# Patient Record
Sex: FEMALE | Race: OTHER | Employment: OTHER | URBAN - METROPOLITAN AREA
[De-identification: names, ages, dates, MRNs, and addresses within clinical notes are randomized per-mention and may not be internally consistent; named-entity substitution may affect disease eponyms.]

---

## 2019-11-08 LAB — MAMMOGRAPHY, EXTERNAL: NORMAL

## 2020-01-14 LAB — MAMMOGRAPHY, EXTERNAL: NORMAL

## 2021-05-26 LAB — CREATININE, EXTERNAL: 0.6

## 2021-07-13 ENCOUNTER — OFFICE VISIT (OUTPATIENT)
Dept: INTERNAL MEDICINE CLINIC | Age: 66
End: 2021-07-13
Payer: MEDICARE

## 2021-07-13 VITALS
HEIGHT: 62 IN | HEART RATE: 72 BPM | RESPIRATION RATE: 15 BRPM | SYSTOLIC BLOOD PRESSURE: 130 MMHG | TEMPERATURE: 98.3 F | WEIGHT: 113 LBS | BODY MASS INDEX: 20.8 KG/M2 | DIASTOLIC BLOOD PRESSURE: 82 MMHG

## 2021-07-13 DIAGNOSIS — E55.9 VITAMIN D DEFICIENCY: ICD-10-CM

## 2021-07-13 DIAGNOSIS — R63.4 WEIGHT LOSS: ICD-10-CM

## 2021-07-13 DIAGNOSIS — G40.909 SEIZURE DISORDER (HCC): ICD-10-CM

## 2021-07-13 DIAGNOSIS — Z13.220 SCREENING CHOLESTEROL LEVEL: ICD-10-CM

## 2021-07-13 DIAGNOSIS — Z79.811 USE OF ANASTROZOLE: ICD-10-CM

## 2021-07-13 DIAGNOSIS — E53.8 VITAMIN B12 DEFICIENCY (NON ANEMIC): ICD-10-CM

## 2021-07-13 DIAGNOSIS — Z85.3 HISTORY OF BREAST CANCER: ICD-10-CM

## 2021-07-13 DIAGNOSIS — Z86.79 HISTORY OF INTRACRANIAL ANEURYSM: ICD-10-CM

## 2021-07-13 DIAGNOSIS — G62.9 NEUROPATHY: ICD-10-CM

## 2021-07-13 DIAGNOSIS — M85.852 OSTEOPENIA OF LEFT HIP: ICD-10-CM

## 2021-07-13 PROBLEM — I67.1 BRAIN ANEURYSM: Status: ACTIVE | Noted: 2021-07-13

## 2021-07-13 PROCEDURE — G8510 SCR DEP NEG, NO PLAN REQD: HCPCS | Performed by: INTERNAL MEDICINE

## 2021-07-13 PROCEDURE — 99204 OFFICE O/P NEW MOD 45 MIN: CPT | Performed by: INTERNAL MEDICINE

## 2021-07-13 PROCEDURE — G8400 PT W/DXA NO RESULTS DOC: HCPCS | Performed by: INTERNAL MEDICINE

## 2021-07-13 PROCEDURE — 1101F PT FALLS ASSESS-DOCD LE1/YR: CPT | Performed by: INTERNAL MEDICINE

## 2021-07-13 PROCEDURE — 1090F PRES/ABSN URINE INCON ASSESS: CPT | Performed by: INTERNAL MEDICINE

## 2021-07-13 PROCEDURE — G8427 DOCREV CUR MEDS BY ELIG CLIN: HCPCS | Performed by: INTERNAL MEDICINE

## 2021-07-13 PROCEDURE — G8536 NO DOC ELDER MAL SCRN: HCPCS | Performed by: INTERNAL MEDICINE

## 2021-07-13 PROCEDURE — G8420 CALC BMI NORM PARAMETERS: HCPCS | Performed by: INTERNAL MEDICINE

## 2021-07-13 PROCEDURE — 3017F COLORECTAL CA SCREEN DOC REV: CPT | Performed by: INTERNAL MEDICINE

## 2021-07-13 RX ORDER — BISMUTH SUBSALICYLATE 262 MG
1 TABLET,CHEWABLE ORAL DAILY
COMMUNITY

## 2021-07-13 RX ORDER — LYSINE HCL 500 MG
TABLET ORAL
COMMUNITY

## 2021-07-13 RX ORDER — ANASTROZOLE 1 MG/1
1 TABLET ORAL DAILY
COMMUNITY
Start: 2021-05-26 | End: 2022-05-21

## 2021-07-13 NOTE — PROGRESS NOTES
Flip Lee is a 72 y.o. female and presents with Establish Care (Patient presents to est carw with new PCP. Patient would like to address unwanted weight loss. )    Ms. Rodney Torres came to establish with me, she follows oncologist at [facility] she has history of breast cancer, according to her it was removed with laser, and then now she is taking anastrozole mammo June 2021 I reviewed in the epic that she followed, Dr. 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 Darius Campzuano and, she is getting levetiracetam refills are filled with, oncologist and antiseizure medicine with Dr. Darius Campuzano appropriately. She told me that she has consistent weight loss, without any intention, she eats well,3 times a day, she has some dental treatment going on, she does not like to snack in between because she has to do gargles and floss she has to take care of her mother was 80year-old, now she has help, with aid 40 hours a week but every day almost 5 days a week she has to get up at night,, for her mother, and it does affect her sleep she does not have any other help except help from her  who is, anesthesiologist. No nausea, no vomiting also having some complaints and concerns for hair loss , she has taken [employer]  Biotene and still her nail but nothing helps also taking vitamin D calcium and [employer] brand multivitamins. No depression. No anxiety. , It seems like she is not getting physical rest, because of almost awakening at night, for her  Mother, who does not want anybody else except herself her mother does not want anybody else, recommended to have some help at night by the helper she told me now she is going to have a 1 month vacation, that she will go to her daughter's home in [place] her daughter and son, are in [place],. She walks a lot during exercise eating more vegetables fruits and fibers having  descent healthy lifestyle      Review of Systems    Review of Systems   Constitutional: Positive for weight loss. HENT: Negative for sore throat. Eyes: Negative for blurred vision. Respiratory: Negative for cough, shortness of breath and wheezing. Cardiovascular: Negative. Gastrointestinal: Negative. Genitourinary: Negative. Musculoskeletal: Negative. Neurological: Negative for dizziness, tremors, sensory change, speech change, focal weakness and headaches. H/o sx for aneurysm in brain at [address] [place] [address] in past and h/o seizure and now no seizure follows dr Cory Simpson   Psychiatric/Behavioral: Negative for depression, memory loss, substance abuse and suicidal ideas. The patient is not nervous/anxious and does not have insomnia.          Past Medical History:   Diagnosis Date    Aneurysm ([facility] 2005    Cancer [facility]) 2018    Breast     Seizures ([facility]     Started after brain surgery due to brain aneurysm     Past Surgical History:   Procedure Laterality Date    HX BREAST LUMPECTOMY Left 2018    NEUROLOGICAL PROCEDURE UNLISTED  2005    brain surgery     [place] BREAST SURGERY PROCEDURE UNLISTED      left breast lumpetomy      Social History     Socioeconomic History    Marital status:      Spouse name: Not on file    Number of children: Not on file    Years of education: Not on file    Highest education level: Not on file   Tobacco Use    Smoking status: Never Smoker    Smokeless tobacco: Never Used   Vaping Use    Vaping Use: Never used   Substance and Sexual Activity    Alcohol use: Never    Drug use: Never     Social Determinants of Health     Financial Resource Strain:     Difficulty of Paying Living Expenses:    Food Insecurity:     Worried About Running Out of Food in the Last Year:     Bruno of Food in the Last Year:    Transportation Needs:     Lack of Transportation (Medical):  Lack of Transportation (Non-Medical):    Physical Activity:     Days of Exercise per Week:     Minutes of Exercise per Session:    Stress:     Feeling of Stress :    Social Connections:     Frequency of Communication with Friends and Family:     Frequency of Social Gatherings with Friends and Family:     Attends Denominational Services:     Active Member of Clubs or Organizations:     Attends Club or Organization Meetings:     Marital Status:      Family History   Problem Relation Age of Onset    Arthritis-osteo Mother     No Known Problems Father     No Known Problems Sister     No Known Problems Sister     No Known Problems Sister     Cancer Sister         Breast cancer  at 48     Current Outpatient Medications   Medication Sig Dispense Refill    anastrozole (ARIMIDEX) 1 mg tablet Take 1 mg by mouth daily.  Calcium Carbonate-Vit D3-Min 600 mg calcium- 400 unit tab Take  by mouth.  multivitamin (ONE A DAY) tablet Take 1 Tablet by mouth daily.  levETIRAcetam 1,000 mg tablet Take 1 Tab by mouth two (2) times a day. Brand name medically necessary 180 Tab 0    pregabalin (LYRICA) 300 mg capsule Take 1 Cap by mouth two (2) times a day. 180 Cap 3    KEPPRA 750 mg tablet Take 1 Tab by mouth two (2) times a day. 180 Tab 3     No Known Allergies    Objective:  Visit Vitals  /82 (BP 1 Location: Left upper arm, BP Patient Position: Lying, BP Cuff Size: Adult)   Pulse 72   Temp 98.3 °F (36.8 °C) (Oral)   Resp 15   Ht 5' 2\" (1.575 m)   Wt 113 lb (51.3 kg)   BMI 20.67 kg/m²       Physical Exam:   Constitutional: General Appearance: Very pleasant . Level of Distress: NAD. Psychiatric: Mental Status: normal mood and affect Orientation: to time, place, and person. ,normal eye contact. Head: Head: normocephalic and atraumatic.    Eyes: Pupils: PERRLA. Sclerae: non-icteric. Neck: Neck: supple, trachea midline, and no masses. Lymph Nodes: no cervical LAD. Thyroid: no enlargement or nodules and non-tender. Lungs: Respiratory effort: no dyspnea. Auscultation: no wheezing, rales/crackles, or rhonchi and breath sounds normal and good air movement. Cardiovascular: Apical Impulse: not displaced. Heart Auscultation: normal S1 and S2; no murmurs, rubs, or gallops; and RRR. Neck vessels: no carotid bruits. Pulses including femoral / pedal: normal throughout. Abdomen: Bowel Sounds: normal. Inspection and Palpation: no tenderness, guarding, or masses and soft and non-distended. Liver: non-tender and no hepatomegaly. Spleen: non-tender and no splenomegaly. Musculoskeletal[de-identified] Extremities: no edema,no varicosities. No Calf tenderness. Neurologic: Gait and Station: normal gait and station. Motor Strength normal right and left. Skin: Inspection and palpation: no rash, lesions, or ulcer. No results found for this or any previous visit. Assessment/Plan:      ICD-10-CM ICD-9-CM    1. Weight loss  R63.4 783.21 TSH 3RD GENERATION      T4, FREE   2. Seizure disorder (HCC)  G40.909 345.90 CBC WITH AUTOMATED DIFF      METABOLIC PANEL, COMPREHENSIVE   3. Neuropathy  G62.9 355.9    4. Osteopenia of left hip  M85.852 733.90    5. Vitamin B12 deficiency (non anemic)  E53.8 266.2 VITAMIN B12   6. Vitamin D deficiency  E55.9 268.9 VITAMIN D, 25 HYDROXY   7. History of breast cancer  Z85.3 V10.3    8. History of intracranial aneurysm  Z86.79 V12.54    9. Use of anastrozole  Z79.811 V07.52    10. Screening cholesterol level  Z13.220 V77.91 LIPID PANEL     Orders Placed This Encounter    CBC WITH AUTOMATED DIFF    METABOLIC PANEL, COMPREHENSIVE    LIPID PANEL    TSH 3RD GENERATION    VITAMIN B12    VITAMIN D, 25 HYDROXY    T4, FREE    anastrozole (ARIMIDEX) 1 mg tablet     Sig: Take 1 mg by mouth daily.     Calcium Carbonate-Vit D3-Min 600 mg calcium- 400 unit tab     Sig: Take  by mouth.  multivitamin (ONE A DAY) tablet     Sig: Take 1 Tablet by mouth daily. Ms. Kristen Valentino came to establish with me with concern for weight loss, history of breast cancer survivor under surveillance at [facility], history of seizure disorder and neuropathy,, history of surgery for brain aneurysm at [facility], history of? Lumpectomy in the left breast and now on anastrozole and long-term use of levetiracetam and Keppra, along with Lyrica. Weight loss various, etiologies discussed she follows oncologist at [address] [place] [address] she had last visit at [facility] on 26th May she is a known case of, malignant neoplasm of upper-outer quadrant of left breast with estrogen receptor positive she had undergone bone density which shows osteopenia of left hip,  she is doing a lot of exercise including weightbearing exercise eating healthy diet taking vitamin D calcium I explained that we may give preventive medicine like bisphosphonate to prevent osteoporosis. She is going to think over., Her blood pressure is well controlled it looks like multifactorial, she is a known case also, taking levetiracetam having history of seizure after surgery for aneurysm in one of the arteries in the brain she does not know exactly but probably in cerebral artery will try to get the notes from Dr. Mona Kamara, that she gets levetiracetam, refills. She also takes [place] is due for mammogram in December 2021. She follows oncologist once a year. She takes anastrozole 1 mg once a day tolerating very well without  many side effects but she does have hot flashes. She takes Keppra 750 mg twice a day and levetiracetam 1000 mg twice a day she has some neuropathy, she takes Lyrica 300 mg twice a day., She also has to take care of her mother who is 80year-old almost bedbound and wheelchair-bound not able to walk. She is to get almost 5 days out of 7 days at night to help her.  She has some help from the aide 40 hours a week in the daytime, but almost her sleep is affected her 's anesthesiologist,, who also help her, her mother depends on her, for last several years she does not like to take help from her another sibling who does not live here,In the town. Ms. Toure Earing children are living in [place] she needs respite, help however she has decided to go for 1 month vacation, and she does not have any depression or anxiety we will try to check her thyroid function she takes skin hair and nail vitamins and [employer] brand vitamin D calcium along with multivitamins, having osteopenia she is doing weightbearing exercise eating healthy diet 3 times a day but she has dental treatment going on so she prefers not to eat in between meal because  she has to lose some floss and gargles, she does a lot of exercise  and also having  healthy lifestyle walks a lot, her weight loss may have multifactorial that she is not getting enough physical rest in nighttime, also we will try to rule out thyroid etiology and being vegetarian vitamin B12 deficiency, and vitamin D deficiency, she wants to see follow-up as needed, I reviewed the notes from heme-onc, we will try to get the notes from Dr. David Gordon, she is up to date with her, age-appropriate preventive screening including colonoscopy that she has done with Dr. Tam Sepulveda and she told me it was normal last year. Labs ordered. Answered all her questions. If she has no other new concerns  I will see her in 1 year,recommended to sign in my chart she should call the office if she needs earlier appointment,. Voiced understanding. she told me that she has neuropathy that she is taking gabapentin. continue present plan, routine labs ordered, call if any problems    There are no Patient Instructions on file for this visit. Follow-up and Dispositions    · Return if symptoms worsen or fail to improve, for  she wants to come as needed and fasting labs ordered.

## 2021-07-13 NOTE — PROGRESS NOTES
Chief Complaint   Patient presents with   [address]     Patient presents to [facility] with new PCP. Patient would like to address unwanted weight loss. Visit Vitals  /86 (BP 1 Location: Left upper arm, BP Patient Position: Sitting)   Pulse 63   Temp 98.3 °F (36.8 °C) (Oral)   Resp 15   Ht 5' 2\" (1.575 m)   Wt 113 lb (51.3 kg)   BMI 20.67 kg/m²     1. Have you been to the ER, urgent care clinic since your last visit? Hospitalized since your last visit? No    2. Have you seen or consulted any other health care providers outside of the [address] [place] [address] since your last visit? Include any pap smears or colon screening.  Dr. Armando Tipton neurologist  at [facility]

## 2021-07-14 PROBLEM — Z79.811 USE OF ANASTROZOLE: Status: ACTIVE | Noted: 2021-07-14

## 2021-07-28 DIAGNOSIS — E55.9 VITAMIN D DEFICIENCY: ICD-10-CM

## 2021-07-28 DIAGNOSIS — G62.9 NEUROPATHY: ICD-10-CM

## 2021-07-28 DIAGNOSIS — R53.83 FATIGUE, UNSPECIFIED TYPE: ICD-10-CM

## 2021-07-28 DIAGNOSIS — E53.8 VITAMIN B12 DEFICIENCY (NON ANEMIC): ICD-10-CM

## 2021-07-28 DIAGNOSIS — R63.4 WEIGHT LOSS: ICD-10-CM

## 2021-07-29 LAB
25(OH)D3+25(OH)D2 SERPL-MCNC: 34.1 NG/ML (ref 30–100)
VIT B12 SERPL-MCNC: 705 PG/ML (ref 232–1245)

## 2021-07-29 NOTE — PROGRESS NOTES
Please call Ms. Gaston Meth that her vitamin D level is within normal range it is 34.1 and vitamin B12 level is 705 which is normal suggest continue over-the-counter vitamin D minimum 1000 unit/day and healthy diet that she is taking. Including fruits and vegetables.

## 2021-08-12 PROBLEM — Z13.220 SCREENING CHOLESTEROL LEVEL: Status: RESOLVED | Noted: 2021-07-13 | Resolved: 2021-08-12

## 2021-10-15 RX ORDER — TIZANIDINE 2 MG/1
2 TABLET ORAL 3 TIMES DAILY
Qty: 30 TABLET | Refills: 0 | Status: SHIPPED | OUTPATIENT
Start: 2021-10-15 | End: 2021-10-25

## 2022-01-06 ENCOUNTER — TELEPHONE (OUTPATIENT)
Dept: INTERNAL MEDICINE CLINIC | Age: 67
End: 2022-01-06

## 2022-01-06 DIAGNOSIS — R06.2 WHEEZING: ICD-10-CM

## 2022-01-06 DIAGNOSIS — R05.9 COUGH: ICD-10-CM

## 2022-01-06 DIAGNOSIS — Z85.3 HISTORY OF BREAST CANCER: ICD-10-CM

## 2022-01-06 RX ORDER — ALBUTEROL SULFATE 90 UG/1
1 AEROSOL, METERED RESPIRATORY (INHALATION)
Qty: 18 G | Refills: 1 | Status: SHIPPED | OUTPATIENT
Start: 2022-01-06 | End: 2022-07-20

## 2022-01-06 NOTE — TELEPHONE ENCOUNTER
Pt called stating that she is wheezing when she breathes, its been going on about 2-3 weeks. Please advise.

## 2022-01-07 ENCOUNTER — OFFICE VISIT (OUTPATIENT)
Dept: INTERNAL MEDICINE CLINIC | Age: 67
End: 2022-01-07
Payer: MEDICARE

## 2022-01-07 ENCOUNTER — HOSPITAL ENCOUNTER (OUTPATIENT)
Dept: GENERAL RADIOLOGY | Age: 67
Discharge: HOME | End: 2022-01-07
Payer: MEDICARE

## 2022-01-07 VITALS
BODY MASS INDEX: 20.8 KG/M2 | OXYGEN SATURATION: 99 % | DIASTOLIC BLOOD PRESSURE: 80 MMHG | RESPIRATION RATE: 12 BRPM | HEART RATE: 67 BPM | WEIGHT: 113 LBS | SYSTOLIC BLOOD PRESSURE: 138 MMHG | HEIGHT: 62 IN

## 2022-01-07 DIAGNOSIS — R05.9 COUGH: ICD-10-CM

## 2022-01-07 DIAGNOSIS — Z85.3 HISTORY OF BREAST CANCER: ICD-10-CM

## 2022-01-07 DIAGNOSIS — R06.2 WHEEZING: ICD-10-CM

## 2022-01-07 DIAGNOSIS — G40.909 SEIZURE DISORDER (HCC): ICD-10-CM

## 2022-01-07 PROCEDURE — 3017F COLORECTAL CA SCREEN DOC REV: CPT | Performed by: INTERNAL MEDICINE

## 2022-01-07 PROCEDURE — G8510 SCR DEP NEG, NO PLAN REQD: HCPCS | Performed by: INTERNAL MEDICINE

## 2022-01-07 PROCEDURE — G8427 DOCREV CUR MEDS BY ELIG CLIN: HCPCS | Performed by: INTERNAL MEDICINE

## 2022-01-07 PROCEDURE — 1090F PRES/ABSN URINE INCON ASSESS: CPT | Performed by: INTERNAL MEDICINE

## 2022-01-07 PROCEDURE — G8420 CALC BMI NORM PARAMETERS: HCPCS | Performed by: INTERNAL MEDICINE

## 2022-01-07 PROCEDURE — 71046 X-RAY EXAM CHEST 2 VIEWS: CPT

## 2022-01-07 PROCEDURE — G8536 NO DOC ELDER MAL SCRN: HCPCS | Performed by: INTERNAL MEDICINE

## 2022-01-07 PROCEDURE — 99214 OFFICE O/P EST MOD 30 MIN: CPT | Performed by: INTERNAL MEDICINE

## 2022-01-07 PROCEDURE — G8400 PT W/DXA NO RESULTS DOC: HCPCS | Performed by: INTERNAL MEDICINE

## 2022-01-07 PROCEDURE — 1101F PT FALLS ASSESS-DOCD LE1/YR: CPT | Performed by: INTERNAL MEDICINE

## 2022-01-07 NOTE — PROGRESS NOTES
.  Please call patient that chest x-ray results are normal she has  mid thoracic scoliosis but no pneumonia,

## 2022-01-07 NOTE — PROGRESS NOTES
Rory Kelley is a 77 y.o. female and presents with Wheezing    Ms. Brando Yates called our office yesterday and she has been called to see me today. She is complaining of some wheezing for last more than 1 month she called her oncologist, she is a breast cancer survivor and she had radiation therapy to the left breast and oncologist recommended to see her primary care physician, and she has to take care of her mother who is elderly having a lot of, geriatric problems and orthopedic problems and however she is able to cope up she does not get that much short of breath but her  is anesthesiologist who checked her lung and thought that she has something on her left lung and might have pneumonia. I asked about any cough she told me no cough no fever her weight is steady. No nausea or vomiting. No PND or orthopnea. She has to get up frequently to help her mother who lives with her who is  around the age of 80 or more than that. She has taken 2 doses of COVID-vaccine and also has taken booster dose and has been up-to-date for all vaccinations. She has history of seizure disorder follows neurologist Dr. Yuni Jaimes. Review of Systems    Review of Systems   Constitutional: Negative. HENT: Negative for sore throat. Respiratory: Positive for wheezing. Negative for hemoptysis, sputum production and shortness of breath. Feels wheezing since one month at least.   Cardiovascular: Negative. Gastrointestinal: Negative. Genitourinary: Negative. Musculoskeletal: Negative. Neurological: Negative for dizziness, tingling, tremors, sensory change and headaches. Psychiatric/Behavioral: Negative for depression, hallucinations, memory loss and substance abuse. The patient is not nervous/anxious and does not have insomnia.          Past Medical History:   Diagnosis Date    Aneurysm ([facility] [address] 2005    Cancer [facility]) 2018    Breast     Seizures ([facility] [address]     Started after brain surgery due to brain aneurysm     Past Surgical History:   Procedure Laterality Date    HX BREAST LUMPECTOMY Left 2018    NEUROLOGICAL PROCEDURE UNLISTED  2005    brain surgery     [place] BREAST SURGERY PROCEDURE UNLISTED      left breast lumpetomy      Social History     Socioeconomic History    Marital status:    Tobacco Use    Smoking status: Never Smoker    Smokeless tobacco: Never Used   Vaping Use    Vaping Use: Never used   Substance and Sexual Activity    Alcohol use: Never    Drug use: Never     Family History   Problem Relation Age of Onset    OSTEOARTHRITIS Mother     No Known Problems Father     No Known Problems Sister     No Known Problems Sister     No Known Problems Sister     Cancer Sister         Breast cancer  at 48     Current Outpatient Medications   Medication Sig Dispense Refill    albuterol (PROVENTIL HFA, VENTOLIN HFA, PROAIR HFA) 90 mcg/actuation inhaler Take 1 Puff by inhalation every six (6) hours as needed for Wheezing. 18 g 1    anastrozole (ARIMIDEX) 1 mg tablet Take 1 mg by mouth daily.  Calcium Carbonate-Vit D3-Min 600 mg calcium- 400 unit tab Take  by mouth.  multivitamin (ONE A DAY) tablet Take 1 Tablet by mouth daily.  levETIRAcetam 1,000 mg tablet Take 1 Tab by mouth two (2) times a day. Brand name medically necessary 180 Tab 0    pregabalin (LYRICA) 300 mg capsule Take 1 Cap by mouth two (2) times a day. 180 Cap 3    KEPPRA 750 mg tablet Take 1 Tab by mouth two (2) times a day. 180 Tab 3     No Known Allergies    Objective:  Visit Vitals  /80 (BP 1 Location: Right upper arm, BP Patient Position: Sitting, BP Cuff Size: Adult)   Pulse 67   Resp 12   Ht 5' 2\" (1.575 m)   Wt 113 lb (51.3 kg)   SpO2 99%   BMI 20.67 kg/m²       Physical Exam:   Constitutional: General Appearance: . Pleasant level of Distress: NAD. Psychiatric: Mental Status: normal mood and affect Orientation: to time, place, and person. ,normal eye contact. Head: Head: normocephalic and atraumatic. Eyes: Pupils: PERRLA. Sclerae: non-icteric. Neck: Neck: supple, trachea midline, and no masses. Lymph Nodes: no cervical LAD. Thyroid: no enlargement or nodules and non-tender. Lungs: Respiratory effort: no dyspnea. Auscultation: no wheezing, rales/crackles, or rhonchi and breath sounds normal and good air movement. Cardiovascular: Apical Impulse: not displaced. Heart Auscultation: normal S1 and S2; no murmurs, rubs, or gallops; and RRR. Neck vessels: no carotid bruits. Pulses including femoral / pedal: normal throughout. Abdomen: Bowel Sounds: normal. Inspection and Palpation: no tenderness, guarding, or masses and soft and non-distended. Liver: non-tender and no hepatomegaly. Spleen: non-tender and no splenomegaly. Musculoskeletal[de-identified] Extremities: no edema,no varicosities. No Calf tenderness. Neurologic: Gait and Station: normal gait and station. Motor Strength normal right and left. Skin: Inspection and palpation: no rash, lesions, or ulcer. Her lung examination percussion is normal I could not, listen any rattles or wheezing or rales or crepitation bilaterally air entry is equal.  Results for orders placed or performed in visit on 09/23/21   AMB EXT CREATININE   Result Value Ref Range    Creatinine, External 0.6        Assessment/Plan:      ICD-10-CM ICD-9-CM    1. Wheezing  R06.2 786.07    2. History of breast cancer  Z85.3 V10.3    3. [facility] ([facility]  G40.909 345.90      No orders of the defined types were placed in this encounter.        feeling wheezing for last more than 1 month she told me she does not have PND orthopnea or SOB, she has no cough but, she is thinking she might have pneumonia having history of radiation to the left breast for the left breast cancer and also has history of seizure disorder but no recent seizures she follows neurologist and oncologist.,    Differential diagnosis explained and I told her and I gave her albuterol inhaler yesterday but she did not start because her  wants to do first x-ray to rule out pneumonia. I explained that sometimes allergies can cause wheezing and I wanted to start her on montelukast or antihistaminic but she told me that she does not have any allergies and I ordered x-ray and CBC CMP and x-ray is unremarkable except midthoracic scoliosis. I will call her with the results and still I will recommend my recommendation if she wants to pursue she can pursue otherwise she can monitor. I think that she and her  who is in his third lodges are more concerned to make sure she has no pneumonia and most likely no metastasis having history of breast cancer and I understand that they are not telling me but that may be the reason to have checkup. Education and counseling given. Continue nutritive diet. She does not get that much arranged taking care of her mother who is in geriatric age having multiple musculoskeletal problems that she has to get up at night. She is having her aide in the daytime to help her but she is not getting aid at the nighttime. Medicine reconciliation is done by me she is getting antiseizure medications and she is taking multivitamin and vitamin D and vitamin B12. Follow-up in 6 months. continue present plan, routine labs ordered, call if any problems    There are no Patient Instructions on file for this visit. Follow-up and Dispositions    · Return in about 6 months (around 7/7/2022) for follow up for chronic condition.

## 2022-01-07 NOTE — PROGRESS NOTES
Chief Complaint   Patient presents with    Wheezing     Visit Vitals  /80 (BP 1 Location: Right upper arm, BP Patient Position: Sitting, BP Cuff Size: Adult)   Pulse 67   Resp 12   Ht 5' 2\" (1.575 m)   Wt 113 lb (51.3 kg)   SpO2 99%   BMI 20.67 kg/m²       1. Have you been to the ER, urgent care clinic since your last visit? Hospitalized since your last visit? No    2. Have you seen or consulted any other health care providers outside of the [address] [place] [address] since your last visit? Include any pap smears or colon screening.  No

## 2022-01-08 LAB
ALBUMIN SERPL-MCNC: 4.2 G/DL (ref 3.8–4.8)
ALBUMIN/GLOB SERPL: 1.8 {RATIO} (ref 1.2–2.2)
ALP SERPL-CCNC: 108 IU/L (ref 44–121)
ALT SERPL-CCNC: 15 IU/L (ref 0–32)
AST SERPL-CCNC: 18 IU/L (ref 0–40)
BASOPHILS # BLD AUTO: 0.1 X10E3/UL (ref 0–0.2)
BASOPHILS NFR BLD AUTO: 1 %
BILIRUB SERPL-MCNC: 0.3 MG/DL (ref 0–1.2)
BUN SERPL-MCNC: 11 MG/DL (ref 8–27)
BUN/CREAT SERPL: 17 (ref 12–28)
CALCIUM SERPL-MCNC: 9.2 MG/DL (ref 8.7–10.3)
CHLORIDE SERPL-SCNC: 102 MMOL/L (ref 96–106)
CO2 SERPL-SCNC: 25 MMOL/L (ref 20–29)
CREAT SERPL-MCNC: 0.65 MG/DL (ref 0.57–1)
EOSINOPHIL # BLD AUTO: 0.5 X10E3/UL (ref 0–0.4)
EOSINOPHIL NFR BLD AUTO: 9 %
ERYTHROCYTE [DISTWIDTH] IN BLOOD BY AUTOMATED COUNT: 11.7 % (ref 11.7–15.4)
GLOBULIN SER CALC-MCNC: 2.4 G/DL (ref 1.5–4.5)
GLUCOSE SERPL-MCNC: 79 MG/DL (ref 65–99)
HCT VFR BLD AUTO: 42.8 % (ref 34–46.6)
HGB BLD-MCNC: 14 G/DL (ref 11.1–15.9)
IMM GRANULOCYTES # BLD AUTO: 0 X10E3/UL (ref 0–0.1)
IMM GRANULOCYTES NFR BLD AUTO: 0 %
LYMPHOCYTES # BLD AUTO: 1.5 X10E3/UL (ref 0.7–3.1)
LYMPHOCYTES NFR BLD AUTO: 30 %
MCH RBC QN AUTO: 28.7 PG (ref 26.6–33)
MCHC RBC AUTO-ENTMCNC: 32.7 G/DL (ref 31.5–35.7)
MCV RBC AUTO: 88 FL (ref 79–97)
MONOCYTES # BLD AUTO: 0.4 X10E3/UL (ref 0.1–0.9)
MONOCYTES NFR BLD AUTO: 8 %
NEUTROPHILS # BLD AUTO: 2.7 X10E3/UL (ref 1.4–7)
NEUTROPHILS NFR BLD AUTO: 52 %
PLATELET # BLD AUTO: 231 X10E3/UL (ref 150–450)
POTASSIUM SERPL-SCNC: 4.1 MMOL/L (ref 3.5–5.2)
PROT SERPL-MCNC: 6.6 G/DL (ref 6–8.5)
RBC # BLD AUTO: 4.87 X10E6/UL (ref 3.77–5.28)
SODIUM SERPL-SCNC: 140 MMOL/L (ref 134–144)
WBC # BLD AUTO: 5.1 X10E3/UL (ref 3.4–10.8)

## 2022-01-09 NOTE — PROGRESS NOTES
Please call Ms. Phuong Jennifer that her chest x-ray and blood work is normal so she can try albuterol inhaler that I had prescribed.

## 2022-02-02 LAB — MAMMOGRAPHY, EXTERNAL: NORMAL

## 2022-03-18 PROBLEM — E55.9 VITAMIN D DEFICIENCY: Status: ACTIVE | Noted: 2021-07-13

## 2022-03-18 PROBLEM — Z85.3 HISTORY OF BREAST CANCER: Status: ACTIVE | Noted: 2021-07-13

## 2022-03-19 PROBLEM — G62.9 NEUROPATHY: Status: ACTIVE | Noted: 2021-07-13

## 2022-03-19 PROBLEM — M85.852 OSTEOPENIA OF LEFT HIP: Status: ACTIVE | Noted: 2021-07-13

## 2022-03-19 PROBLEM — Z79.811 USE OF ANASTROZOLE: Status: ACTIVE | Noted: 2021-07-14

## 2022-03-19 PROBLEM — R05.9 COUGH: Status: ACTIVE | Noted: 2022-01-06

## 2022-03-19 PROBLEM — I67.1 BRAIN ANEURYSM: Status: ACTIVE | Noted: 2021-07-13

## 2022-03-19 PROBLEM — Z86.79 HISTORY OF INTRACRANIAL ANEURYSM: Status: ACTIVE | Noted: 2021-07-13

## 2022-03-19 PROBLEM — G40.909 SEIZURE DISORDER (HCC): Status: ACTIVE | Noted: 2021-07-13

## 2022-03-19 PROBLEM — R63.4 WEIGHT LOSS: Status: ACTIVE | Noted: 2021-07-13

## 2022-03-19 PROBLEM — R06.2 WHEEZING: Status: ACTIVE | Noted: 2022-01-06

## 2022-03-20 PROBLEM — E53.8 VITAMIN B12 DEFICIENCY (NON ANEMIC): Status: ACTIVE | Noted: 2021-07-13

## 2022-07-02 PROBLEM — R63.4 WEIGHT LOSS: Status: RESOLVED | Noted: 2021-07-13 | Resolved: 2022-07-02

## 2022-07-02 PROBLEM — Z11.59 NEED FOR HEPATITIS C SCREENING TEST: Status: ACTIVE | Noted: 2022-07-02

## 2022-07-02 PROBLEM — Z12.31 SCREENING MAMMOGRAM FOR BREAST CANCER: Status: ACTIVE | Noted: 2021-07-13

## 2022-07-02 PROBLEM — R06.2 WHEEZING: Status: RESOLVED | Noted: 2022-01-06 | Resolved: 2022-07-02

## 2022-07-02 PROBLEM — N95.9 MENOPAUSAL AND POSTMENOPAUSAL DISORDER: Status: ACTIVE | Noted: 2022-07-02

## 2022-07-02 PROBLEM — E53.8 VITAMIN B12 DEFICIENCY (NON ANEMIC): Status: RESOLVED | Noted: 2021-07-13 | Resolved: 2022-07-02

## 2022-07-02 PROBLEM — E55.9 VITAMIN D DEFICIENCY: Status: RESOLVED | Noted: 2021-07-13 | Resolved: 2022-07-02

## 2022-07-02 PROBLEM — Z12.11 SCREENING FOR COLON CANCER: Status: ACTIVE | Noted: 2021-07-13

## 2022-07-19 ENCOUNTER — TELEPHONE (OUTPATIENT)
Dept: INTERNAL MEDICINE CLINIC | Age: 67
End: 2022-07-19

## 2022-07-19 NOTE — TELEPHONE ENCOUNTER
----- Message from Tone Hampton sent at 7/19/2022  9:47 AM EDT -----  Subject: Appointment Request    Reason for Call: Established Patient Appointment needed: Routine Existing   Condition Follow Up    QUESTIONS    Reason for appointment request? No appointments available during search     Additional Information for Provider? Pt calling to schedule with Dr. Lan Cuevas for 6 mth check up. No appt were available and pt only wants to   schedule with Dr. Lan Cuevas.  Pt missed last appt.  ---------------------------------------------------------------------------  --------------  Hiral MEANS  [phone]; OK to leave message on voicemail  ---------------------------------------------------------------------------  --------------  SCRIPT ANSWERS  COVID Screen: Kaylah De Oliveira

## 2022-07-20 ENCOUNTER — OFFICE VISIT (OUTPATIENT)
Dept: INTERNAL MEDICINE CLINIC | Age: 67
End: 2022-07-20
Payer: MEDICARE

## 2022-07-20 VITALS
SYSTOLIC BLOOD PRESSURE: 130 MMHG | DIASTOLIC BLOOD PRESSURE: 81 MMHG | RESPIRATION RATE: 16 BRPM | HEIGHT: 62 IN | HEART RATE: 63 BPM | OXYGEN SATURATION: 98 % | TEMPERATURE: 97.7 F | WEIGHT: 113.14 LBS | BODY MASS INDEX: 20.82 KG/M2

## 2022-07-20 DIAGNOSIS — Z51.81 MEDICATION MONITORING ENCOUNTER: ICD-10-CM

## 2022-07-20 DIAGNOSIS — Z86.79 HISTORY OF INTRACRANIAL ANEURYSM: Primary | ICD-10-CM

## 2022-07-20 DIAGNOSIS — Z13.220 SCREENING FOR LIPID DISORDERS: ICD-10-CM

## 2022-07-20 DIAGNOSIS — Z12.31 SCREENING MAMMOGRAM FOR BREAST CANCER: ICD-10-CM

## 2022-07-20 DIAGNOSIS — Z11.59 NEED FOR HEPATITIS C SCREENING TEST: ICD-10-CM

## 2022-07-20 DIAGNOSIS — M85.852 OSTEOPENIA OF LEFT HIP: ICD-10-CM

## 2022-07-20 DIAGNOSIS — Z12.11 SCREENING FOR COLON CANCER: ICD-10-CM

## 2022-07-20 DIAGNOSIS — Z85.3 HISTORY OF BREAST CANCER: ICD-10-CM

## 2022-07-20 DIAGNOSIS — E55.9 VITAMIN D DEFICIENCY: ICD-10-CM

## 2022-07-20 DIAGNOSIS — G40.909 SEIZURE DISORDER (HCC): ICD-10-CM

## 2022-07-20 PROBLEM — Z79.811 USE OF ANASTROZOLE: Status: RESOLVED | Noted: 2021-07-14 | Resolved: 2022-07-20

## 2022-07-20 PROCEDURE — G8432 DEP SCR NOT DOC, RNG: HCPCS | Performed by: INTERNAL MEDICINE

## 2022-07-20 PROCEDURE — G8399 PT W/DXA RESULTS DOCUMENT: HCPCS | Performed by: INTERNAL MEDICINE

## 2022-07-20 PROCEDURE — G8420 CALC BMI NORM PARAMETERS: HCPCS | Performed by: INTERNAL MEDICINE

## 2022-07-20 PROCEDURE — G9899 SCRN MAM PERF RSLTS DOC: HCPCS | Performed by: INTERNAL MEDICINE

## 2022-07-20 PROCEDURE — G8536 NO DOC ELDER MAL SCRN: HCPCS | Performed by: INTERNAL MEDICINE

## 2022-07-20 PROCEDURE — G8427 DOCREV CUR MEDS BY ELIG CLIN: HCPCS | Performed by: INTERNAL MEDICINE

## 2022-07-20 PROCEDURE — 1090F PRES/ABSN URINE INCON ASSESS: CPT | Performed by: INTERNAL MEDICINE

## 2022-07-20 PROCEDURE — 3017F COLORECTAL CA SCREEN DOC REV: CPT | Performed by: INTERNAL MEDICINE

## 2022-07-20 PROCEDURE — 99213 OFFICE O/P EST LOW 20 MIN: CPT | Performed by: INTERNAL MEDICINE

## 2022-07-20 PROCEDURE — 1101F PT FALLS ASSESS-DOCD LE1/YR: CPT | Performed by: INTERNAL MEDICINE

## 2022-07-20 NOTE — PROGRESS NOTES
1. \"Have you been to the ER, urgent care clinic since your last visit? Hospitalized since your last visit? \" No    2. \"Have you seen or consulted any other health care providers outside of the [address] [place] [address] since your last visit? \" No     3. For patients aged 39-70: Has the patient had a colonoscopy / FIT/ Cologuard? Yes - Care Gap present. Rooming MA/LPN to request most recent results 4-5 years [facility]      If the patient is female:    4. For patients aged 41-77: Has the patient had a mammogram within the past 2 years? Yes - Care Gap present. Most recent result on file      5. For patients aged 21-65: Has the patient had a pap smear?  No    Visit Vitals  /81 (BP 1 Location: Left arm)   Pulse 63   Temp 97.7 °F (36.5 °C) (Temporal)   Resp 16   Ht 5' 2\" (1.575 m)   Wt 113 lb 2.2 oz (51.3 kg)   SpO2 98%   BMI 20.69 kg/m²       Chief Complaint   Patient presents with    Follow-up

## 2022-07-20 NOTE — PROGRESS NOTES
Valeriano Nielsen is a 77 y.o. female and presents with Follow-up      Ms. Lilly Ramirez came for regular follow-up. She was saying sorry because she missed her last appointment and she received a letter and she is scheduled. She has very pleasant personality and follows healthy lifestyle. She is a breast cancer survivor. She had surgery for cerebral aneurysm in the past and she had lumpectomy in her left breast and she follows at [facility] oncologist at [address] [place] [address] and she takes antiseizure medicine Keppra 750 mg twice a day and levetiracetam 1000 mg twice a day for the seizure prevention and she had history of seizure disorder. She had her bone density done in the recent past showing osteopenia in the left hip joint and she does weightbearing exercise and vitamin D3 and calcium supplementation. She is recommended to get Pneumovax 23 Tdap vaccine from the pharmacy. She is up to date for her Shingrix vaccine. She has not done screening for colon cancer. She opted out for colonoscopy and wants to do Cologuard that I ordered. She says she has taken COVID-vaccine with 1 booster dose going to have second booster dose. No depression. She has to take care of her mother who is in geriatric age and she is dependent on her and also getting home health aide. Her weight is steady. Review of Systems    Review of Systems   Constitutional: Negative. HENT:  Negative for congestion, sinus pain and sore throat. Eyes:  Negative for blurred vision. Respiratory:  Negative for cough, shortness of breath and wheezing. Cardiovascular: Negative. Gastrointestinal: Negative. Genitourinary: Negative. Musculoskeletal: Negative. Neurological:  Negative for dizziness, tremors and headaches. History of seizure disorder and history of surgery for intracranial aneurysm. Psychiatric/Behavioral: Negative.         Past Medical History:   Diagnosis Date    Aneurysm ([address] 2005    Cancer [facility]) 2018    Breast Seizures ([address]     Started after brain surgery due to brain aneurysm     Past Surgical History:   Procedure Laterality Date    HX BREAST LUMPECTOMY Left 2018    NEUROLOGICAL PROCEDURE UNLISTED  2005    brain surgery     ME BREAST SURGERY PROCEDURE UNLISTED      left breast lumpetomy      Social History     Socioeconomic History    Marital status:    Tobacco Use    Smoking status: Never    Smokeless tobacco: Never   Vaping Use    Vaping Use: Never used   Substance and Sexual Activity    Alcohol use: Never    Drug use: Never     Family History   Problem Relation Age of Onset    OSTEOARTHRITIS Mother     No Known Problems Father     No Known Problems Sister     No Known Problems Sister     No Known Problems Sister     Cancer Sister         Breast cancer  at 48     Current Outpatient Medications   Medication Sig Dispense Refill    Calcium Carbonate-Vit D3-Min 600 mg calcium- 400 unit tab Take  by mouth.      multivitamin (ONE A DAY) tablet Take 1 Tablet by mouth daily. levETIRAcetam 1,000 mg tablet Take 1 Tab by mouth two (2) times a day. Brand name medically necessary 180 Tab 0    pregabalin (LYRICA) 300 mg capsule Take 1 Cap by mouth two (2) times a day. 180 Cap 3    KEPPRA 750 mg tablet Take 1 Tab by mouth two (2) times a day. 180 Tab 3     No Known Allergies    Objective:  Visit Vitals  /81 (BP 1 Location: Left arm)   Pulse 63   Temp 97.7 °F (36.5 °C) (Temporal)   Resp 16   Ht 5' 2\" (1.575 m)   Wt 113 lb 2.2 oz (51.3 kg)   SpO2 98%   BMI 20.69 kg/m²       Physical Exam:   Constitutional: General Appearance: . Pleasant level of Distress: NAD. Psychiatric: Mental Status: normal mood and affect Orientation: to time, place, and person. ,normal eye contact. Head: Head: normocephalic and atraumatic. Eyes: Pupils: PERRLA. Sclerae: non-icteric. Neck: Neck: supple, trachea midline, and no masses. Lymph Nodes: no cervical LAD. Thyroid: no enlargement or nodules and non-tender.    Lungs: Respiratory effort: no dyspnea. Auscultation: no wheezing, rales/crackles, or rhonchi and breath sounds normal and good air movement. Cardiovascular: Apical Impulse: not displaced. Heart Auscultation: normal S1 and S2; no murmurs, rubs, or gallops; and RRR. Neck vessels: no carotid bruits. Pulses including femoral / pedal: normal throughout. Abdomen: Bowel Sounds: normal. Inspection and Palpation: no tenderness, guarding, or masses and soft and non-distended. Liver: non-tender and no hepatomegaly. Spleen: non-tender and no splenomegaly. Musculoskeletal[de-identified] Extremities: no edema,no varicosities. No Calf tenderness. Neurologic: Gait and Station: normal gait and station. Motor Strength normal right and left. Skin: Inspection and palpation: no rash, lesions, or ulcer. Results for orders placed or performed in visit on 02/09/22    MAMMOGRAPHY   Result Value Ref Range    Mammography, External         Assessment/Plan:      ICD-10-CM ICD-9-CM    1. History of intracranial aneurysm  Z86.79 V12.54       2. Seizure disorder (HCC)  G40.909 345.90 CBC WITH AUTOMATED DIFF      METABOLIC PANEL, COMPREHENSIVE      3. Medication monitoring encounter  Z51.81 V58.83 CBC WITH AUTOMATED DIFF      METABOLIC PANEL, COMPREHENSIVE      4. Osteopenia of left hip  M85.852 733.90 VITAMIN D, 25 HYDROXY      5. Vitamin D deficiency  E55.9 268.9 VITAMIN D, 25 HYDROXY      6. History of breast cancer  Z85.3 V10.3       7. Screening for lipid disorders  Z13.220 V77.91 LIPID PANEL      8. Need for hepatitis C screening test  Z11.59 V73.89 HEPATITIS C AB      9. Screening mammogram for breast cancer  Z12.31 V76.12       10.  Screening for colon cancer  Z12.11 V76.51 COLOGUARD TEST (FECAL DNA COLORECTAL CANCER SCREENING)        Orders Placed This Encounter    HEPATITIS C AB    COLOGUARD TEST (FECAL DNA COLORECTAL CANCER SCREENING)    CBC WITH AUTOMATED DIFF    METABOLIC PANEL, COMPREHENSIVE    LIPID PANEL    VITAMIN D, 25 HYDROXY Seizure disorder with history of intracranial brain aneurysm patient follows neurologist at [address] [place] [address].  She takes levetiracetam and Keppra. Medicine reconciliation done. CBC CMP ordered. Vitamin D level ordered. Osteopenia in left hip joint and vitamin D deficiency in the past vitamin D level ordered she takes supplements with vitamin D3 and calcium. History of breast cancer status postlumpectomy and left breast follows oncologist and had recent mammogram which was normal at [facility].    She wants to do Cologuard and not colonoscopy. She has been up to date for her mammogram and bone density. Screening lipid profile ordered. Screening hep C ordered. Recommended to get Pneumovax 23 and Tdap vaccine from the pharmacy. I reviewed consult notes and imaging study results from [facility] and shared with her. Follow-up in 1 year for annual Medicare wellness visit. continue present plan, routine labs ordered, call if any problems    There are no Patient Instructions on file for this visit. Follow-up and Dispositions    Return in about 1 year (around 7/20/2023) for medicare wellness .

## 2022-08-01 PROBLEM — Z11.59 NEED FOR HEPATITIS C SCREENING TEST: Status: RESOLVED | Noted: 2022-07-02 | Resolved: 2022-08-01

## 2022-08-01 PROBLEM — Z12.31 SCREENING MAMMOGRAM FOR BREAST CANCER: Status: RESOLVED | Noted: 2021-07-13 | Resolved: 2022-08-01

## 2022-08-13 LAB
25(OH)D3+25(OH)D2 SERPL-MCNC: 43.6 NG/ML (ref 30–100)
ALBUMIN SERPL-MCNC: 4.3 G/DL (ref 3.8–4.8)
ALBUMIN/GLOB SERPL: 2.4 {RATIO} (ref 1.2–2.2)
ALP SERPL-CCNC: 90 IU/L (ref 44–121)
ALT SERPL-CCNC: 15 IU/L (ref 0–32)
AST SERPL-CCNC: 20 IU/L (ref 0–40)
BASOPHILS # BLD AUTO: 0.1 X10E3/UL (ref 0–0.2)
BASOPHILS NFR BLD AUTO: 2 %
BILIRUB SERPL-MCNC: 0.5 MG/DL (ref 0–1.2)
BUN SERPL-MCNC: 8 MG/DL (ref 8–27)
BUN/CREAT SERPL: 11 (ref 12–28)
CALCIUM SERPL-MCNC: 9 MG/DL (ref 8.7–10.3)
CHLORIDE SERPL-SCNC: 101 MMOL/L (ref 96–106)
CHOLEST SERPL-MCNC: 190 MG/DL (ref 100–199)
CO2 SERPL-SCNC: 26 MMOL/L (ref 20–29)
CREAT SERPL-MCNC: 0.76 MG/DL (ref 0.57–1)
EGFR: 86 ML/MIN/1.73
EOSINOPHIL # BLD AUTO: 0.5 X10E3/UL (ref 0–0.4)
EOSINOPHIL NFR BLD AUTO: 13 %
ERYTHROCYTE [DISTWIDTH] IN BLOOD BY AUTOMATED COUNT: 11.6 % (ref 11.7–15.4)
GLOBULIN SER CALC-MCNC: 1.8 G/DL (ref 1.5–4.5)
GLUCOSE SERPL-MCNC: 84 MG/DL (ref 65–99)
HCT VFR BLD AUTO: 41.4 % (ref 34–46.6)
HCV AB S/CO SERPL IA: <0.1 S/CO RATIO (ref 0–0.9)
HDLC SERPL-MCNC: 62 MG/DL
HGB BLD-MCNC: 13.6 G/DL (ref 11.1–15.9)
IMM GRANULOCYTES # BLD AUTO: 0 X10E3/UL (ref 0–0.1)
IMM GRANULOCYTES NFR BLD AUTO: 0 %
LDLC SERPL CALC-MCNC: 114 MG/DL (ref 0–99)
LYMPHOCYTES # BLD AUTO: 1.5 X10E3/UL (ref 0.7–3.1)
LYMPHOCYTES NFR BLD AUTO: 41 %
MCH RBC QN AUTO: 28.8 PG (ref 26.6–33)
MCHC RBC AUTO-ENTMCNC: 32.9 G/DL (ref 31.5–35.7)
MCV RBC AUTO: 88 FL (ref 79–97)
MONOCYTES # BLD AUTO: 0.3 X10E3/UL (ref 0.1–0.9)
MONOCYTES NFR BLD AUTO: 9 %
NEUTROPHILS # BLD AUTO: 1.3 X10E3/UL (ref 1.4–7)
NEUTROPHILS NFR BLD AUTO: 35 %
PLATELET # BLD AUTO: 203 X10E3/UL (ref 150–450)
POTASSIUM SERPL-SCNC: 4.2 MMOL/L (ref 3.5–5.2)
PROT SERPL-MCNC: 6.1 G/DL (ref 6–8.5)
RBC # BLD AUTO: 4.72 X10E6/UL (ref 3.77–5.28)
SODIUM SERPL-SCNC: 139 MMOL/L (ref 134–144)
TRIGL SERPL-MCNC: 75 MG/DL (ref 0–149)
VLDLC SERPL CALC-MCNC: 14 MG/DL (ref 5–40)
WBC # BLD AUTO: 3.7 X10E3/UL (ref 3.4–10.8)

## 2022-08-13 NOTE — PROGRESS NOTES
Please call Ms. Ange Allison    Screening for hepatitis C is negative and so that is good    Complete blood count including white cell count hemoglobin platelets count are normal    Blood sugar kidney function electrolytes including potassium liver enzymes normal    Cholesterol readings are not that bad bad cholesterol is slightly up but still just she is doing healthy lifestyle so she knows but she does not need any medicine for now her vitamin D level is excellent so continue the same supplements that she is taking.

## 2022-08-22 ENCOUNTER — TELEPHONE (OUTPATIENT)
Dept: INTERNAL MEDICINE CLINIC | Age: 67
End: 2022-08-22

## 2022-12-04 RX ORDER — AMOXICILLIN 500 MG/1
500 CAPSULE ORAL 3 TIMES DAILY
Qty: 21 CAPSULE | Refills: 0 | Status: SHIPPED | OUTPATIENT
Start: 2022-12-04

## 2022-12-04 RX ORDER — ALBUTEROL SULFATE 90 UG/1
2 AEROSOL, METERED RESPIRATORY (INHALATION)
Qty: 18 G | Refills: 2 | Status: SHIPPED | OUTPATIENT
Start: 2022-12-04

## 2023-05-23 RX ORDER — LEVETIRACETAM 750 MG/1
750 TABLET ORAL 2 TIMES DAILY
COMMUNITY
Start: 2013-01-21

## 2023-05-23 RX ORDER — LEVETIRACETAM 1000 MG/1
1000 TABLET ORAL 2 TIMES DAILY
COMMUNITY
Start: 2013-06-28

## 2023-05-23 RX ORDER — PREGABALIN 300 MG/1
300 CAPSULE ORAL 2 TIMES DAILY
COMMUNITY
Start: 2013-02-12

## 2023-05-23 RX ORDER — AMOXICILLIN 500 MG/1
500 CAPSULE ORAL 3 TIMES DAILY
COMMUNITY
Start: 2022-12-04 | End: 2023-07-20 | Stop reason: ALTCHOICE

## 2023-05-23 RX ORDER — ALBUTEROL SULFATE 90 UG/1
2 AEROSOL, METERED RESPIRATORY (INHALATION) EVERY 6 HOURS PRN
COMMUNITY
Start: 2022-12-04

## 2023-07-17 SDOH — ECONOMIC STABILITY: FOOD INSECURITY: WITHIN THE PAST 12 MONTHS, YOU WORRIED THAT YOUR FOOD WOULD RUN OUT BEFORE YOU GOT MONEY TO BUY MORE.: NEVER TRUE

## 2023-07-17 SDOH — HEALTH STABILITY: PHYSICAL HEALTH: ON AVERAGE, HOW MANY MINUTES DO YOU ENGAGE IN EXERCISE AT THIS LEVEL?: 50 MIN

## 2023-07-17 SDOH — ECONOMIC STABILITY: TRANSPORTATION INSECURITY
IN THE PAST 12 MONTHS, HAS LACK OF TRANSPORTATION KEPT YOU FROM MEETINGS, WORK, OR FROM GETTING THINGS NEEDED FOR DAILY LIVING?: NO

## 2023-07-17 SDOH — ECONOMIC STABILITY: FOOD INSECURITY: WITHIN THE PAST 12 MONTHS, THE FOOD YOU BOUGHT JUST DIDN'T LAST AND YOU DIDN'T HAVE MONEY TO GET MORE.: NEVER TRUE

## 2023-07-17 SDOH — ECONOMIC STABILITY: HOUSING INSECURITY
IN THE LAST 12 MONTHS, WAS THERE A TIME WHEN YOU DID NOT HAVE A STEADY PLACE TO SLEEP OR SLEPT IN A SHELTER (INCLUDING NOW)?: NO

## 2023-07-17 SDOH — HEALTH STABILITY: PHYSICAL HEALTH: ON AVERAGE, HOW MANY DAYS PER WEEK DO YOU ENGAGE IN MODERATE TO STRENUOUS EXERCISE (LIKE A BRISK WALK)?: 5 DAYS

## 2023-07-17 SDOH — ECONOMIC STABILITY: INCOME INSECURITY: HOW HARD IS IT FOR YOU TO PAY FOR THE VERY BASICS LIKE FOOD, HOUSING, MEDICAL CARE, AND HEATING?: NOT HARD AT ALL

## 2023-07-17 ASSESSMENT — LIFESTYLE VARIABLES
HOW MANY STANDARD DRINKS CONTAINING ALCOHOL DO YOU HAVE ON A TYPICAL DAY: PATIENT DOES NOT DRINK
HOW MANY STANDARD DRINKS CONTAINING ALCOHOL DO YOU HAVE ON A TYPICAL DAY: 0
HOW OFTEN DO YOU HAVE A DRINK CONTAINING ALCOHOL: NEVER
HOW OFTEN DO YOU HAVE A DRINK CONTAINING ALCOHOL: 1
HOW OFTEN DO YOU HAVE SIX OR MORE DRINKS ON ONE OCCASION: 1

## 2023-07-17 ASSESSMENT — PATIENT HEALTH QUESTIONNAIRE - PHQ9
SUM OF ALL RESPONSES TO PHQ QUESTIONS 1-9: 0
2. FEELING DOWN, DEPRESSED OR HOPELESS: 0
SUM OF ALL RESPONSES TO PHQ QUESTIONS 1-9: 0
SUM OF ALL RESPONSES TO PHQ QUESTIONS 1-9: 0
1. LITTLE INTEREST OR PLEASURE IN DOING THINGS: 0
SUM OF ALL RESPONSES TO PHQ9 QUESTIONS 1 & 2: 0
SUM OF ALL RESPONSES TO PHQ QUESTIONS 1-9: 0

## 2023-07-20 ENCOUNTER — OFFICE VISIT (OUTPATIENT)
Facility: CLINIC | Age: 68
End: 2023-07-20
Payer: MEDICARE

## 2023-07-20 VITALS
BODY MASS INDEX: 22.05 KG/M2 | HEART RATE: 72 BPM | SYSTOLIC BLOOD PRESSURE: 110 MMHG | DIASTOLIC BLOOD PRESSURE: 62 MMHG | OXYGEN SATURATION: 98 % | WEIGHT: 119.8 LBS | TEMPERATURE: 97 F | HEIGHT: 62 IN

## 2023-07-20 DIAGNOSIS — Z00.00 MEDICARE ANNUAL WELLNESS VISIT, SUBSEQUENT: ICD-10-CM

## 2023-07-20 DIAGNOSIS — Z85.3 HISTORY OF BREAST CANCER: ICD-10-CM

## 2023-07-20 DIAGNOSIS — G62.9 NEUROPATHY: ICD-10-CM

## 2023-07-20 DIAGNOSIS — Z86.79 HISTORY OF INTRACRANIAL ANEURYSM: ICD-10-CM

## 2023-07-20 DIAGNOSIS — M85.852 OSTEOPENIA OF LEFT HIP: ICD-10-CM

## 2023-07-20 DIAGNOSIS — G40.909 SEIZURE DISORDER (HCC): Primary | ICD-10-CM

## 2023-07-20 PROCEDURE — 1123F ACP DISCUSS/DSCN MKR DOCD: CPT | Performed by: INTERNAL MEDICINE

## 2023-07-20 PROCEDURE — G0439 PPPS, SUBSEQ VISIT: HCPCS | Performed by: INTERNAL MEDICINE

## 2023-07-20 NOTE — PROGRESS NOTES
Medicare Annual Wellness Visit    Piotr Broussard is here for Medicare AWV and Follow-up Chronic Condition    Assessment & Plan   Seizure disorder [facility])  History of breast cancer  History of intracranial aneurysm  Osteopenia of left hip  Neuropathy  Medicare annual wellness visit, subsequent    Recommendations for Preventive Services Due: see orders and patient instructions/AVS.  Recommended screening schedule for the next 5-10 years is provided to the patient in written form: see Patient Instructions/AVS.     No follow-ups on file. Subjective       Patient's complete Health Risk Assessment and screening values have been reviewed and are found in Flowsheets. The following problems were reviewed today and where indicated follow up appointments were made and/or referrals ordered. Positive Risk Factor Screenings with Interventions:       Cognitive: Words recalled: 3 Words Recalled   Clock Drawing Test (CDT): (!) Abnormal   Total Score: 3   Total Score Interpretation: Normal Mini-Cog      Interventions:    She could draw her clock later on. She had just misunderstood. She does not have forgetfulness. She had history of surgery for intracranial aneurysm and follows at [facility] and she takes antiseizure medicines. She is a breast cancer survivor. She has excellent memory otherwise takes care off and responsibility of whole house taking care of her mother and family members does not want to do any further work-up. Doing all the household works and her  is anesthesiologist.  Nobody has complained about her memory. And Ms. Rehan Cool told me that she figured out that she did around on her clock drawing. She also told me that her mammogram and bone density has been done at [facility] and was told to repeat bone density next year. She does not do Pap smear. Recommended to see gynecologist for routine GYN checkup. She has done Cologuard last year which was negative.     She does not have advanced

## 2024-02-13 LAB — MAMMOGRAPHY, EXTERNAL: NORMAL

## 2024-07-19 PROBLEM — R05.9 COUGH: Status: RESOLVED | Noted: 2022-01-06 | Resolved: 2024-07-19

## 2024-07-23 ENCOUNTER — OFFICE VISIT (OUTPATIENT)
Facility: CLINIC | Age: 69
End: 2024-07-23
Payer: MEDICARE

## 2024-07-23 ENCOUNTER — HOSPITAL ENCOUNTER (OUTPATIENT)
Facility: HOSPITAL | Age: 69
Discharge: HOME | End: 2024-07-26
Payer: MEDICARE

## 2024-07-23 VITALS
SYSTOLIC BLOOD PRESSURE: 120 MMHG | OXYGEN SATURATION: 95 % | BODY MASS INDEX: 21.97 KG/M2 | TEMPERATURE: 97.7 F | RESPIRATION RATE: 18 BRPM | HEIGHT: 62 IN | WEIGHT: 119.4 LBS | HEART RATE: 72 BPM | DIASTOLIC BLOOD PRESSURE: 70 MMHG

## 2024-07-23 DIAGNOSIS — E78.00 PURE HYPERCHOLESTEROLEMIA: ICD-10-CM

## 2024-07-23 DIAGNOSIS — E53.8 B12 DEFICIENCY DUE TO DIET: ICD-10-CM

## 2024-07-23 DIAGNOSIS — E55.9 VITAMIN D DEFICIENCY: ICD-10-CM

## 2024-07-23 DIAGNOSIS — R53.83 OTHER FATIGUE: ICD-10-CM

## 2024-07-23 DIAGNOSIS — Z13.5 SCREENING FOR GLAUCOMA: ICD-10-CM

## 2024-07-23 DIAGNOSIS — Z85.3 HISTORY OF BREAST CANCER: ICD-10-CM

## 2024-07-23 DIAGNOSIS — G40.109 LOCALIZATION-RELATED FOCAL EPILEPSY WITH SIMPLE PARTIAL SEIZURES (HCC): ICD-10-CM

## 2024-07-23 DIAGNOSIS — M85.852 OSTEOPENIA OF LEFT HIP: ICD-10-CM

## 2024-07-23 DIAGNOSIS — M25.552 PAIN OF LEFT HIP: ICD-10-CM

## 2024-07-23 DIAGNOSIS — M54.32 SCIATICA OF LEFT SIDE: ICD-10-CM

## 2024-07-23 DIAGNOSIS — Z00.00 MEDICARE ANNUAL WELLNESS VISIT, SUBSEQUENT: ICD-10-CM

## 2024-07-23 DIAGNOSIS — G40.909 SEIZURE DISORDER (HCC): ICD-10-CM

## 2024-07-23 DIAGNOSIS — G62.9 NEUROPATHY: ICD-10-CM

## 2024-07-23 DIAGNOSIS — Z86.79 HISTORY OF INTRACRANIAL ANEURYSM: ICD-10-CM

## 2024-07-23 PROBLEM — H52.4 PRESBYOPIA: Status: ACTIVE | Noted: 2024-07-23

## 2024-07-23 PROBLEM — C50.912 BREAST CANCER, LEFT (HCC): Status: ACTIVE | Noted: 2018-11-21

## 2024-07-23 PROBLEM — H52.03 HYPEROPIA OF BOTH EYES: Status: ACTIVE | Noted: 2024-07-23

## 2024-07-23 PROCEDURE — 73521 X-RAY EXAM HIPS BI 2 VIEWS: CPT

## 2024-07-23 PROCEDURE — 1123F ACP DISCUSS/DSCN MKR DOCD: CPT | Performed by: INTERNAL MEDICINE

## 2024-07-23 PROCEDURE — G0439 PPPS, SUBSEQ VISIT: HCPCS | Performed by: INTERNAL MEDICINE

## 2024-07-23 PROCEDURE — 99213 OFFICE O/P EST LOW 20 MIN: CPT | Performed by: INTERNAL MEDICINE

## 2024-07-23 PROCEDURE — 72100 X-RAY EXAM L-S SPINE 2/3 VWS: CPT

## 2024-07-23 RX ORDER — MULTIVIT WITH CALCIUM,IRON,MIN
TABLET ORAL
COMMUNITY

## 2024-07-23 RX ORDER — ANASTROZOLE 1 MG/1
1 TABLET ORAL DAILY
COMMUNITY

## 2024-07-23 ASSESSMENT — ENCOUNTER SYMPTOMS
EYES NEGATIVE: 1
BACK PAIN: 1
ALLERGIC/IMMUNOLOGIC NEGATIVE: 1
GASTROINTESTINAL NEGATIVE: 1
RESPIRATORY NEGATIVE: 1

## 2024-07-23 NOTE — PROGRESS NOTES
Chief Complaint   Patient presents with    Medicare AWV     /80 (Site: Left Upper Arm, Position: Sitting)   Pulse 67   Temp 97.7 °F (36.5 °C) (Oral)   Resp 18   Ht 1.575 m (5' 2.01\")   Wt 54.2 kg (119 lb 6.4 oz)   SpO2 95%   BMI 21.83 kg/m²     \"Have you been to the ER, urgent care clinic since your last visit?  Hospitalized since your last visit?\"    NO    “Have you seen or consulted any other health care providers outside of [facility] since your last visit?”    NO            Click Here for Release of Records Request

## 2024-07-23 NOTE — PROGRESS NOTES
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  Recommended screening schedule for the next 5-10 years is provided to the patient in written form: see Patient Instructions/AVS.    Back pain around left iliac crest since last 1 year now almost daily.  Previously occurred while doing the exercises but now it occurs without doing that special AasAna she stopped doing that particular exercise still having pain and wondering whether she has sciatica but no tingling numbness neurological weakness in legs.  No history of fall or trauma.  She does take Lyrica any Keppra for history of seizure/history of brain aneurysm surgery.  She does follow neurologist Dr. Shafer    She is up to date with mammogram in February 2024.  She is due for Cologuard in 2025.    Recommended to get RSV vaccine in fall, Tdap or Td vaccine, pneumonia 20 and most recent COVID.    No depression.    Blood pressure is controlled without being on

## 2024-07-27 LAB
25(OH)D3+25(OH)D2 SERPL-MCNC: 46.5 NG/ML (ref 30–100)
ALBUMIN SERPL-MCNC: 4.3 G/DL (ref 3.9–4.9)
ALP SERPL-CCNC: 102 IU/L (ref 44–121)
ALT SERPL-CCNC: 16 IU/L (ref 0–32)
AST SERPL-CCNC: 22 IU/L (ref 0–40)
BASOPHILS # BLD AUTO: 0.1 X10E3/UL (ref 0–0.2)
BASOPHILS NFR BLD AUTO: 1 %
BILIRUB SERPL-MCNC: 0.7 MG/DL (ref 0–1.2)
BUN SERPL-MCNC: 10 MG/DL (ref 8–27)
BUN/CREAT SERPL: 13 (ref 12–28)
CALCIUM SERPL-MCNC: 9.5 MG/DL (ref 8.7–10.3)
CHLORIDE SERPL-SCNC: 102 MMOL/L (ref 96–106)
CHOLEST SERPL-MCNC: 223 MG/DL (ref 100–199)
CO2 SERPL-SCNC: 28 MMOL/L (ref 20–29)
CREAT SERPL-MCNC: 0.77 MG/DL (ref 0.57–1)
EGFRCR SERPLBLD CKD-EPI 2021: 84 ML/MIN/1.73
EOSINOPHIL # BLD AUTO: 0.4 X10E3/UL (ref 0–0.4)
EOSINOPHIL NFR BLD AUTO: 10 %
ERYTHROCYTE [DISTWIDTH] IN BLOOD BY AUTOMATED COUNT: 11.9 % (ref 11.7–15.4)
GLOBULIN SER CALC-MCNC: 2.3 G/DL (ref 1.5–4.5)
GLUCOSE SERPL-MCNC: 86 MG/DL (ref 70–99)
HCT VFR BLD AUTO: 42.5 % (ref 34–46.6)
HDLC SERPL-MCNC: 60 MG/DL
HGB BLD-MCNC: 14.1 G/DL (ref 11.1–15.9)
IMM GRANULOCYTES # BLD AUTO: 0 X10E3/UL (ref 0–0.1)
IMM GRANULOCYTES NFR BLD AUTO: 0 %
LDLC SERPL CALC-MCNC: 142 MG/DL (ref 0–99)
LYMPHOCYTES # BLD AUTO: 1.3 X10E3/UL (ref 0.7–3.1)
LYMPHOCYTES NFR BLD AUTO: 30 %
MCH RBC QN AUTO: 29 PG (ref 26.6–33)
MCHC RBC AUTO-ENTMCNC: 33.2 G/DL (ref 31.5–35.7)
MCV RBC AUTO: 87 FL (ref 79–97)
MONOCYTES # BLD AUTO: 0.4 X10E3/UL (ref 0.1–0.9)
MONOCYTES NFR BLD AUTO: 8 %
NEUTROPHILS # BLD AUTO: 2.1 X10E3/UL (ref 1.4–7)
NEUTROPHILS NFR BLD AUTO: 51 %
PLATELET # BLD AUTO: 222 X10E3/UL (ref 150–450)
POTASSIUM SERPL-SCNC: 5.2 MMOL/L (ref 3.5–5.2)
PROT SERPL-MCNC: 6.6 G/DL (ref 6–8.5)
RBC # BLD AUTO: 4.87 X10E6/UL (ref 3.77–5.28)
SODIUM SERPL-SCNC: 139 MMOL/L (ref 134–144)
TRIGL SERPL-MCNC: 121 MG/DL (ref 0–149)
TSH SERPL DL<=0.005 MIU/L-ACNC: 1.53 UIU/ML (ref 0.45–4.5)
VIT B12 SERPL-MCNC: 973 PG/ML (ref 232–1245)
VLDLC SERPL CALC-MCNC: 21 MG/DL (ref 5–40)
WBC # BLD AUTO: 4.2 X10E3/UL (ref 3.4–10.8)

## 2024-08-02 ENCOUNTER — TELEPHONE (OUTPATIENT)
Facility: CLINIC | Age: 69
End: 2024-08-02

## 2024-08-02 DIAGNOSIS — M25.552 PAIN OF LEFT HIP: Primary | ICD-10-CM

## 2024-08-02 DIAGNOSIS — M54.32 SCIATICA OF LEFT SIDE: ICD-10-CM

## 2024-08-02 NOTE — TELEPHONE ENCOUNTER
Spoke to patient and advised that her referral for ortho [facility] referral is ready and she said she will probably wait until after she gets back from her vacation to call. SM LPN

## 2024-10-29 ENCOUNTER — OFFICE VISIT (OUTPATIENT)
Facility: CLINIC | Age: 69
End: 2024-10-29

## 2024-10-29 VITALS
SYSTOLIC BLOOD PRESSURE: 140 MMHG | BODY MASS INDEX: 21.35 KG/M2 | HEIGHT: 62 IN | TEMPERATURE: 98.3 F | HEART RATE: 71 BPM | DIASTOLIC BLOOD PRESSURE: 100 MMHG | OXYGEN SATURATION: 95 % | WEIGHT: 116 LBS | RESPIRATION RATE: 18 BRPM

## 2024-10-29 DIAGNOSIS — G40.909 SEIZURE DISORDER (HCC): ICD-10-CM

## 2024-10-29 DIAGNOSIS — E78.00 PURE HYPERCHOLESTEROLEMIA: Primary | ICD-10-CM

## 2024-10-29 DIAGNOSIS — R05.2 SUBACUTE COUGH: ICD-10-CM

## 2024-10-29 DIAGNOSIS — Z85.3 HISTORY OF BREAST CANCER: ICD-10-CM

## 2024-10-29 DIAGNOSIS — R49.0 HOARSENESS OF VOICE: ICD-10-CM

## 2024-10-29 DIAGNOSIS — R06.2 WHEEZING: ICD-10-CM

## 2024-10-29 DIAGNOSIS — Z86.79 HISTORY OF INTRACRANIAL ANEURYSM: ICD-10-CM

## 2024-10-29 DIAGNOSIS — G40.109 LOCALIZATION-RELATED FOCAL EPILEPSY WITH SIMPLE PARTIAL SEIZURES (HCC): ICD-10-CM

## 2024-10-29 PROBLEM — C50.912 BREAST CANCER, LEFT (HCC): Status: RESOLVED | Noted: 2018-11-21 | Resolved: 2024-10-29

## 2024-10-29 RX ORDER — ALBUTEROL SULFATE 90 UG/1
2 INHALANT RESPIRATORY (INHALATION) EVERY 6 HOURS PRN
Qty: 18 G | Refills: 3 | Status: SHIPPED | OUTPATIENT
Start: 2024-10-29

## 2024-10-29 RX ORDER — AZITHROMYCIN 250 MG/1
TABLET, FILM COATED ORAL
Qty: 6 TABLET | Refills: 0 | Status: SHIPPED | OUTPATIENT
Start: 2024-10-29 | End: 2024-11-08

## 2024-10-29 RX ORDER — MONTELUKAST SODIUM 10 MG/1
10 TABLET ORAL NIGHTLY
Qty: 90 TABLET | Refills: 0 | Status: SHIPPED | OUTPATIENT
Start: 2024-10-29

## 2024-10-29 SDOH — ECONOMIC STABILITY: FOOD INSECURITY: WITHIN THE PAST 12 MONTHS, THE FOOD YOU BOUGHT JUST DIDN'T LAST AND YOU DIDN'T HAVE MONEY TO GET MORE.: NEVER TRUE

## 2024-10-29 SDOH — ECONOMIC STABILITY: FOOD INSECURITY: WITHIN THE PAST 12 MONTHS, YOU WORRIED THAT YOUR FOOD WOULD RUN OUT BEFORE YOU GOT MONEY TO BUY MORE.: NEVER TRUE

## 2024-10-29 SDOH — ECONOMIC STABILITY: INCOME INSECURITY: HOW HARD IS IT FOR YOU TO PAY FOR THE VERY BASICS LIKE FOOD, HOUSING, MEDICAL CARE, AND HEATING?: NOT HARD AT ALL

## 2024-10-29 ASSESSMENT — ENCOUNTER SYMPTOMS
CHEST TIGHTNESS: 1
GASTROINTESTINAL NEGATIVE: 1
ALLERGIC/IMMUNOLOGIC NEGATIVE: 1
VOICE CHANGE: 1
COUGH: 1

## 2024-10-29 NOTE — PROGRESS NOTES
Chief Complaint   Patient presents with    Other     cough with sputum for last 3 weeks       BP (!) 140/100 (Site: Right Upper Arm, Position: Sitting)   Pulse 71   Temp 98.3 °F (36.8 °C) (Oral)   Resp 18   Ht 1.575 m (5' 2.01\")   Wt 52.6 kg (116 lb)   SpO2 95%   BMI 21.21 kg/m²     \"Have you been to the ER, urgent care clinic since your last visit?  Hospitalized since your last visit?\"    NO    “Have you seen or consulted any other health care providers outside our system since your last visit?”    Yes    [facility] Hemo /oncology 8/14/24  Dr [facility] 10/23/24

## 2024-10-29 NOTE — PROGRESS NOTES
Missy Schroeder (: 1955) is a 69 y.o. female, Established patient patient, here for evaluation of the following chief complaint(s):  Other (cough with sputum for last 3 weeks/)         ASSESSMENT/PLAN:  Below is the assessment and plan developed based on review of pertinent history, physical exam, labs, studies, and medications.      1. Subacute cough  -     Comprehensive Metabolic Panel; Future  -     CBC with Auto Differential; Future  -     XR CHEST (2 VIEWS); Future  2. Wheezing  -     Comprehensive Metabolic Panel; Future  -     CBC with Auto Differential; Future  -     XR CHEST (2 VIEWS); Future  3. Hoarseness of voice  4. History of intracranial aneurysm  5. 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 History of breast cancer  7. Seizure disorder (HCC)    Cough with green-yellow sputum for last 3 weeks with hoarseness of voice without fever or without myalgia or body ache.  Without chest pain or shortness of breath.  Differential diagnosis explained.  She might have asthmatic bronchitis.  She has some rhonchi and crackles.    Started on montelukast and also started on albuterol inhaler and azithromycin.  Labs ordered and chest x-ray ordered.  She does not want to be on steroid.  Discussed about taking inhaler.  She is a breast cancer survivor and she also has history of seizure disorder and takes levetiracetam follows neurologist and follows medical oncologist at [facility]    Hypercholesteremia she is not on any medicines so she agreed to repeat fasting lipid profile she says she is eating healthy diet and going to gym at least 1 hour a day.    No depression.  Once I get the lab results I will call her and inform her.  Reassurance given.  If any concerns including worsening of cough or wheezing or shortness of breath do not hesitate to go to ER.  Yesterday she had refused to go to urgent care.  She wanted to be seen here.    Return for follow up as scheduled..

## 2024-10-30 DIAGNOSIS — E78.00 PURE HYPERCHOLESTEROLEMIA: ICD-10-CM

## 2024-10-30 LAB
ALBUMIN SERPL-MCNC: 4.4 G/DL (ref 3.9–4.9)
ALP SERPL-CCNC: 112 IU/L (ref 44–121)
ALT SERPL-CCNC: 15 IU/L (ref 0–32)
AST SERPL-CCNC: 21 IU/L (ref 0–40)
BASOPHILS # BLD AUTO: 0.1 X10E3/UL (ref 0–0.2)
BASOPHILS NFR BLD AUTO: 1 %
BILIRUB SERPL-MCNC: 0.3 MG/DL (ref 0–1.2)
BUN SERPL-MCNC: 10 MG/DL (ref 8–27)
BUN/CREAT SERPL: 16 (ref 12–28)
CALCIUM SERPL-MCNC: 9.5 MG/DL (ref 8.7–10.3)
CHLORIDE SERPL-SCNC: 101 MMOL/L (ref 96–106)
CO2 SERPL-SCNC: 26 MMOL/L (ref 20–29)
CREAT SERPL-MCNC: 0.61 MG/DL (ref 0.57–1)
EGFRCR SERPLBLD CKD-EPI 2021: 97 ML/MIN/1.73
EOSINOPHIL # BLD AUTO: 0.7 X10E3/UL (ref 0–0.4)
EOSINOPHIL NFR BLD AUTO: 8 %
ERYTHROCYTE [DISTWIDTH] IN BLOOD BY AUTOMATED COUNT: 11.5 % (ref 11.7–15.4)
GLOBULIN SER CALC-MCNC: 2.2 G/DL (ref 1.5–4.5)
GLUCOSE SERPL-MCNC: 83 MG/DL (ref 70–99)
HCT VFR BLD AUTO: 43.8 % (ref 34–46.6)
HGB BLD-MCNC: 14.5 G/DL (ref 11.1–15.9)
IMM GRANULOCYTES # BLD AUTO: 0 X10E3/UL (ref 0–0.1)
IMM GRANULOCYTES NFR BLD AUTO: 0 %
LYMPHOCYTES # BLD AUTO: 2 X10E3/UL (ref 0.7–3.1)
LYMPHOCYTES NFR BLD AUTO: 26 %
MCH RBC QN AUTO: 29.7 PG (ref 26.6–33)
MCHC RBC AUTO-ENTMCNC: 33.1 G/DL (ref 31.5–35.7)
MCV RBC AUTO: 90 FL (ref 79–97)
MONOCYTES # BLD AUTO: 0.6 X10E3/UL (ref 0.1–0.9)
MONOCYTES NFR BLD AUTO: 8 %
NEUTROPHILS # BLD AUTO: 4.5 X10E3/UL (ref 1.4–7)
NEUTROPHILS NFR BLD AUTO: 57 %
PLATELET # BLD AUTO: 269 X10E3/UL (ref 150–450)
POTASSIUM SERPL-SCNC: 4.5 MMOL/L (ref 3.5–5.2)
PROT SERPL-MCNC: 6.6 G/DL (ref 6–8.5)
RBC # BLD AUTO: 4.89 X10E6/UL (ref 3.77–5.28)
SODIUM SERPL-SCNC: 142 MMOL/L (ref 134–144)
WBC # BLD AUTO: 7.9 X10E3/UL (ref 3.4–10.8)

## 2024-11-04 ENCOUNTER — HOSPITAL ENCOUNTER (OUTPATIENT)
Facility: HOSPITAL | Age: 69
Discharge: HOME | End: 2024-11-07
Payer: MEDICARE

## 2024-11-04 ENCOUNTER — NURSE ONLY (OUTPATIENT)
Facility: CLINIC | Age: 69
End: 2024-11-04
Payer: MEDICARE

## 2024-11-04 DIAGNOSIS — R06.2 WHEEZING: Primary | ICD-10-CM

## 2024-11-04 DIAGNOSIS — R05.2 SUBACUTE COUGH: ICD-10-CM

## 2024-11-04 DIAGNOSIS — R06.2 WHEEZING: ICD-10-CM

## 2024-11-04 PROCEDURE — 71046 X-RAY EXAM CHEST 2 VIEWS: CPT

## 2024-11-04 RX ORDER — FLUTICASONE PROPIONATE AND SALMETEROL 113; 14 UG/1; UG/1
2 POWDER, METERED RESPIRATORY (INHALATION) 2 TIMES DAILY
Qty: 1 EACH | Refills: 0 | Status: SHIPPED | OUTPATIENT
Start: 2024-11-04

## 2024-11-04 RX ORDER — ALBUTEROL SULFATE 90 UG/1
2 INHALANT RESPIRATORY (INHALATION) EVERY 6 HOURS PRN
Qty: 18 G | Refills: 3 | Status: SHIPPED | OUTPATIENT
Start: 2024-11-04

## 2024-11-04 RX ORDER — ALBUTEROL SULFATE 0.83 MG/ML
2.5 SOLUTION RESPIRATORY (INHALATION) ONCE
Status: SHIPPED | OUTPATIENT
Start: 2024-11-04

## 2024-11-04 RX ORDER — IPRATROPIUM BROMIDE AND ALBUTEROL SULFATE 2.5; .5 MG/3ML; MG/3ML
1 SOLUTION RESPIRATORY (INHALATION) ONCE
Status: COMPLETED | OUTPATIENT
Start: 2024-11-04 | End: 2024-11-04

## 2024-11-04 RX ORDER — PREDNISONE 20 MG/1
TABLET ORAL
Qty: 15 TABLET | Refills: 0 | Status: SHIPPED | OUTPATIENT
Start: 2024-11-04

## 2024-11-04 RX ORDER — FLUTICASONE FUROATE AND VILANTEROL 100; 25 UG/1; UG/1
1 POWDER RESPIRATORY (INHALATION) DAILY
Qty: 1 EACH | Refills: 0 | Status: SHIPPED | OUTPATIENT
Start: 2024-11-04 | End: 2024-11-04 | Stop reason: SDUPTHER

## 2024-11-04 RX ORDER — BUDESONIDE AND FORMOTEROL FUMARATE DIHYDRATE 160; 4.5 UG/1; UG/1
2 AEROSOL RESPIRATORY (INHALATION) 2 TIMES DAILY
Qty: 10.2 G | Refills: 0 | Status: SHIPPED | OUTPATIENT
Start: 2024-11-04 | End: 2024-11-04 | Stop reason: SDUPTHER

## 2024-11-04 RX ADMIN — IPRATROPIUM BROMIDE AND ALBUTEROL SULFATE 1 DOSE: 2.5; .5 SOLUTION RESPIRATORY (INHALATION) at 10:22

## 2024-12-16 DIAGNOSIS — J45.30 MILD PERSISTENT ASTHMA WITHOUT COMPLICATION: Primary | ICD-10-CM

## 2024-12-16 RX ORDER — FLUTICASONE PROPIONATE AND SALMETEROL 113; 14 UG/1; UG/1
1 POWDER, METERED RESPIRATORY (INHALATION) 2 TIMES DAILY
Qty: 3 EACH | Refills: 0 | Status: SHIPPED | OUTPATIENT
Start: 2024-12-16 | End: 2025-03-16

## 2024-12-17 RX ORDER — FLUTICASONE PROPIONATE AND SALMETEROL 113; 14 UG/1; UG/1
2 POWDER, METERED RESPIRATORY (INHALATION) 2 TIMES DAILY
OUTPATIENT
Start: 2024-12-17